# Patient Record
Sex: MALE | Race: WHITE | Employment: FULL TIME | URBAN - METROPOLITAN AREA
[De-identification: names, ages, dates, MRNs, and addresses within clinical notes are randomized per-mention and may not be internally consistent; named-entity substitution may affect disease eponyms.]

---

## 2021-08-03 ENCOUNTER — TRANSFERRED RECORDS (OUTPATIENT)
Dept: HEALTH INFORMATION MANAGEMENT | Facility: CLINIC | Age: 51
End: 2021-08-03

## 2021-08-31 ENCOUNTER — TRANSCRIBE ORDERS (OUTPATIENT)
Dept: OTHER | Age: 51
End: 2021-08-31

## 2021-08-31 DIAGNOSIS — M25.511 RIGHT SHOULDER PAIN, UNSPECIFIED CHRONICITY: Primary | ICD-10-CM

## 2021-09-16 ENCOUNTER — PRE VISIT (OUTPATIENT)
Dept: ORTHOPEDICS | Facility: CLINIC | Age: 51
End: 2021-09-16

## 2021-09-22 ENCOUNTER — TELEPHONE (OUTPATIENT)
Dept: ORTHOPEDICS | Facility: CLINIC | Age: 51
End: 2021-09-22

## 2021-09-22 NOTE — TELEPHONE ENCOUNTER
[facility] Call Center    Phone Message    May a detailed message be left on voicemail: yes     Reason for Call: Patient called returning call to the care team. Please advise. Thank you.    Action Taken: Message routed to:  Adult Clinics: Orthopedics p [id number]    Travel Screening: Not Applicable

## 2021-09-22 NOTE — TELEPHONE ENCOUNTER
Callback #4 I think. Left another VM. Might need Pt to reschedule if we can't get any records.    Was finally able to speak to Pt. He works nights which is why is schedule is off.   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 He reports having an injury to his c-spine in 01/2019 (work comp) and he had surgery. About 6 months after this surgery, he developed these symptoms and they have not resolved like he was told.     I am unclear as to who he has all seen and what workup he has had for it, but he reports that it was recommended he have a tendon transfer and that is why he has been referred to us. States he has MRI imaging for his c-spine and XR imaging for his right shoulder, done recently (last few months).     He will try to bring these images for us to upload.  I also [facility] to send us faxed records and to push us images.     Peter Caban RN   
[facility] Call Center    Phone Message    May a detailed message be left on voicemail: yes     Reason for Call: Patient calling back returning a phone call from this morning. Please advise. Thank you    Action Taken: Message routed to:  Adult Clinics: Orthopedics p [id number]    Travel Screening: Not Applicable                                                                              [facility] Call [facility]    Phone Message    May a detailed message be left on voicemail: yes     Reason for Call: The patient returned a call to the Ortho Care Team.  He will wait for a call back.  Thank you.     Action Taken: Message routed to:  Adult Clinics: Orthopedics p [id number]    Travel Screening: Not Applicable                                                                      
Right shoulder pain, ref'd by Benjie Hua ([facility]    Was originally a WC injury for slipped disc, had surgery for this, then had some nerve damage, partially recovered now, still having issues with serratus anterior dysfunction (lateral rib muscle)    Called and left VM for Pt to discuss. Not sure this appt is for same thing seen for at [place]. We also have no records or imaging.     *Pt called and I called back and left another VM.    Peter Caban RN   
Continue assessing patient, give any medications as per order.
EKG

## 2021-09-23 ENCOUNTER — OFFICE VISIT (OUTPATIENT)
Dept: ORTHOPEDICS | Facility: CLINIC | Age: 51
End: 2021-09-23
Payer: COMMERCIAL

## 2021-09-23 ENCOUNTER — TRANSFERRED RECORDS (OUTPATIENT)
Dept: HEALTH INFORMATION MANAGEMENT | Facility: CLINIC | Age: 51
End: 2021-09-23

## 2021-09-23 DIAGNOSIS — G58.9 LONG THORACIC NERVE LESION: Primary | ICD-10-CM

## 2021-09-23 DIAGNOSIS — M25.511 RIGHT SHOULDER PAIN, UNSPECIFIED CHRONICITY: ICD-10-CM

## 2021-09-23 PROCEDURE — 99202 OFFICE O/P NEW SF 15 MIN: CPT | Performed by: ORTHOPAEDIC SURGERY

## 2021-09-23 RX ORDER — SERTRALINE HYDROCHLORIDE 100 MG/1
TABLET, FILM COATED ORAL
COMMUNITY
Start: 2020-12-22

## 2021-09-23 RX ORDER — GABAPENTIN 300 MG/1
CAPSULE ORAL
COMMUNITY
Start: 2020-01-09

## 2021-09-23 RX ORDER — PRAVASTATIN SODIUM 40 MG
TABLET ORAL
COMMUNITY
Start: 2020-12-22

## 2021-09-23 RX ORDER — LISINOPRIL 40 MG/1
TABLET ORAL
COMMUNITY
Start: 2020-12-22

## 2021-09-23 NOTE — LETTER
9/23/2021         RE: Raul Jiménez  [address]  [place] [address]        Dear Colleague,    Thank you for referring your patient, Raul Jiménez, to the [facility]. Please see a copy of my visit note below.    Chief Concern: Consult - Right shoulder pain  Date of Injury: 01/21/19    History of Present Illness:   Raul Jiménez is a right hand dominant 50 year old male who presents today for evaluation of right shoulder pain. The patient states enduring an injury while working on 01/21/2019 involving his neck and right arm. After the discovery of a cervical disc herniation, he underwent a spinal operation. 4 weeks post op, he was noted to have severe dysfunction of his right upper extremity and was eventually diagnosed with Parsonage-Mckinney syndrome. He attended physical therapy for this, but has been left with residual weakness specifically involving the right serratus anterior. He was evaluated at [facility] with the discussion of benefit from a tendon transfer. His surgery was cancelled due to insurance/work comp, but he presents to me today [facility].     At the time of visit, he states no changes in his right arm symptoms. 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 He states if he works more than 7 hours, his right arm will not function well the day after, such as not being able to  things. He also complains constant of right sided neck pain as well as pain between his shoulder blades.    Review of Systems:   A 10-point review of systems was obtained and is negative except for as noted in the HPI.     Medications:      gabapentin (NEURONTIN) 300 MG capsule, TAKE 2 CAPSULES BY MOUTH AT BEDTIME FOR 7 DAYS THEN TAKE 3 CAPSULES BY MOUTH AT BEDTIME FOR PAIN AND NUMBNESS, Disp: , Rfl:      lisinopril (ZESTRIL) 40 MG tablet, TAKE ONE TABLET BY MOUTH EVERY DAY FOR BLOOD PRESSURE,  Disp: , Rfl:      pravastatin (PRAVACHOL) 40 MG tablet, TAKE ONE TABLET BY MOUTH AT BEDTIME FOR CHOLESTEROL, Disp: , Rfl:      sertraline (ZOLOFT) 100 MG tablet, TAKE ONE-HALF TABLET BY MOUTH EVERY DAY FOR MOOD, Disp: , Rfl:     Allergies:  Lisinopril     Past Medical History:  Neuropathic pain of shoulder, right  Dorsal scapular nerve palsy  Parsonage-Gonzales syndrome  C6-C7 anterior decompression and C6-C7 disc arthroplasty  Diverticulitis  Cervical disc disease  [place] War syndrome  Hypertension    Past Surgical History:  Hemorrhoidectomy  ACDF C6-7     Social History:  Works as a . He admits to tobacco use and admits to moderate alcohol use.     Family History:  Father - hypertension    Physical Examination:  Adult male in no acute distress. Articulates and communicates with normal affect.  Respirations even and unlabored  Focused upper extremity exam: With activel elevation of both shoulders, he demonstrates grossly abnormal scapulothoracic motion on the right side. Rhomboids, upper and middle trapezius strength is full. However, he has serratus anterior weakness on the right side compared to the left. On the right, with resisted forward flexion at 40  he has winging of the scapula. His GH motion is active forward elevation to 95, external rotation to 75, internal rotation to L2.     Imaging:   MR of the Shoulder Right (11/13/2019)  1.  Posterior labral tearing with a tiny paralabral cyst.   2.  Low-grade cartilage fissuring along the posterior glenoid.   3.  Intact rotator cuff tendons and biceps tendon.   4.  Mild acromioclavicular joint degenerative changes.  Report per radiology.    I have independently reviewed the above imaging studies; the results were discussed with the patient.     Assessment:   1. Serratus anterior palsy  2. Parsinage gonzales syndrome    Plan:   I discussed with the patient his desires for treatment and treatment plan. He has an established relationship with [place] for treatment of this  condition and he would like to return there to complete their surgical plan. I communicated with my colleagues at the [facility] to coordinate the patient receiving his care at [place].  - Recommendation for referral back to Dr. Grajeda for planned [place] transfer.    Scribe Disclosure:  I, Nathalia Lares, am serving as a scribe to document services personally performed by Marcelina Gracia MD at this visit, based upon the provider's statements to me. All documentation has been reviewed by the aforementioned provider prior to being entered into the official medical record.     I, Nathalia Lares, a scribe, prepared the chart for today's encounter.     Provider Disclosure:  I agree with above History, Physical exam and Plan.  I have reviewed the content of the documentation and have edited it as needed. I have personally performed the services documented here and the documentation accurately represents those services and the decisions I have made.      Marcelina Gracia MD        Again, thank you for allowing me to participate in the care of your patient.        Sincerely,        Marcelina Gracia MD

## 2021-09-23 NOTE — PROGRESS NOTES
Chief Concern: Consult - Right shoulder pain  Date of Injury: 01/21/19    History of Present Illness:   Raul Jiménez is a right hand dominant 50 year old male who presents today for evaluation of right shoulder pain. The patient states enduring an injury while working on 01/21/2019 involving his neck and right arm. After the discovery of a cervical disc herniation, he underwent a spinal operation. 4 weeks post op, he was noted to have severe dysfunction of his right upper extremity and was eventually diagnosed with Parsonage-Mckinney syndrome. He attended physical therapy for this, but has been left with residual weakness specifically involving the right serratus anterior. He was evaluated at [facility] with the discussion of benefit from a tendon transfer. His surgery was cancelled due to insurance/work comp, but he [facility] to [facility].     At the time of visit, he states no changes in his right arm symptoms. 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 He states if he works more than 7 hours, his right arm will not function well the day after, such as not being able to  things. He also complains constant of right sided neck pain as well as pain between his shoulder blades.    Review of Systems:   A 10-point review of systems was obtained and is negative except for as noted in the HPI.     Medications:      gabapentin (NEURONTIN) 300 MG capsule, TAKE 2 CAPSULES BY MOUTH AT BEDTIME FOR 7 DAYS THEN TAKE 3 CAPSULES BY MOUTH AT BEDTIME FOR PAIN AND NUMBNESS, Disp: , Rfl:      lisinopril (ZESTRIL) 40 MG tablet, TAKE ONE TABLET BY MOUTH EVERY DAY FOR BLOOD PRESSURE, Disp: , Rfl:      pravastatin (PRAVACHOL) 40 MG tablet, TAKE ONE TABLET BY MOUTH AT BEDTIME FOR CHOLESTEROL, Disp: , Rfl:      sertraline (ZOLOFT) 100 MG tablet, TAKE ONE-HALF TABLET BY MOUTH EVERY DAY FOR MOOD, Disp: , Rfl:     Allergies:  Lisinopril      Past Medical History:  Neuropathic pain of shoulder, right  Dorsal scapular nerve palsy  Parsonage-Gonzales syndrome  C6-C7 anterior decompression and C6-C7 disc arthroplasty  Diverticulitis  Cervical disc disease  [place] War syndrome  Hypertension    Past Surgical History:  Hemorrhoidectomy  ACDF C6-7     Social History:  Works as a . He admits to tobacco use and admits to moderate alcohol use.     Family History:  Father - hypertension    Physical Examination:  Adult male in no acute distress. Articulates and communicates with normal affect.  Respirations even and unlabored  Focused upper extremity exam: With activel elevation of both shoulders, he demonstrates grossly abnormal scapulothoracic motion on the right side. Rhomboids, upper and middle trapezius strength is full. However, he has serratus anterior weakness on the right side compared to the left. On the right, with resisted forward flexion at 40  he has winging of the scapula. His GH motion is active forward elevation to 95, external rotation to 75, internal rotation to L2.     Imaging:   MR of the Shoulder Right (11/13/2019)  1.  Posterior labral tearing with a tiny paralabral cyst.   2.  Low-grade cartilage fissuring along the posterior glenoid.   3.  Intact rotator cuff tendons and biceps tendon.   4.  Mild acromioclavicular joint degenerative changes.  Report per radiology.    I have independently reviewed the above imaging studies; the results were discussed with the patient.     Assessment:   1. Serratus anterior palsy  2. Parsinage gonzales syndrome    Plan:   I discussed with the patient his desires for treatment and treatment plan. He has an established relationship with [place] for treatment of this condition and he would like to return there to complete their surgical plan. I communicated with my colleagues at the [facility] to coordinate the patient receiving his care at [place].  - Recommendation for referral back to Dr. Grajeda for planned  Split [place] transfer.    Scribe Disclosure:  I, Nathalia Lares, am serving as a scribe to document services personally performed by Marcelina Gracia MD at this visit, based upon the provider's statements to me. All documentation has been reviewed by the aforementioned provider prior to being entered into the official medical record.     I, Nathalia Lares, a scribe, prepared the chart for today's encounter.     Provider Disclosure:  I agree with above History, Physical exam and Plan.  I have reviewed the content of the documentation and have edited it as needed. I have personally performed the services documented here and the documentation accurately represents those services and the decisions I have made.      Marcelina Gracia MD

## 2021-09-23 NOTE — NURSING NOTE
Reason For Visit:   Chief Complaint   Patient presents with     Consult     Right shoulder pain, 1/21/2019 injury at work with cervical spine. Parstinage gonzales syndrome. Has done PT       PCP: Juliann Franco  [facility]    ?  No  Occupation Cooking.  Currently working? Yes.  Work status?  Full time.  Date of injury: 1/21/2019 - door fell on him at work  Type of injury: WC.  Date of surgery: No shoulder surgeries. Has had cervical surgeries and injections.   Smoker: Yes  Request smoking cessation information: No    Right hand dominant    SANE score  Affected shoulder: Right   Right shoulder SANE: 50  Left shoulder SANE: 100    There were no vitals taken for this visit.    Kenisha Condon, ATC